# Patient Record
Sex: MALE | Race: WHITE | NOT HISPANIC OR LATINO | URBAN - METROPOLITAN AREA
[De-identification: names, ages, dates, MRNs, and addresses within clinical notes are randomized per-mention and may not be internally consistent; named-entity substitution may affect disease eponyms.]

---

## 2017-08-11 NOTE — PROCEDURE NOTE: CLINICAL
PROCEDURE NOTE: Avastin () #3 OD. Diagnosis: Neovascular AMD with Inactive CNV. Prior to the original injection, risks/benefits/alternatives discussed including infection, loss of vision, hemorrhage, cataract, glaucoma, retinal tears or detachment. A written consent is on file, and the need for today’s injection was discussed and the patient is understanding and wishes to proceed. The off-label status of Intravitreal Avastin also was reviewed. The patient wished to proceed with treatment. The patient wished to proceed with treatment. Topical anesthetic drops were applied to the eye. Betadine prep was performed. Surgical mask worn. Sterile drape and lid speculum were applied. Using the syringe provided, Avastin 1.25 mg in 0.05 cc was injected into the vitreous cavity. Injection site: 3-4 mm from the limbus. Patient tolerated procedure well. Following the intravitreal injection, the sterile lid speculum was removed. CRA perfusion confirmed. CF vision checked. Patient given office phone number/answering service number and advised to call immediately should there be an increase in floaters or redness, loss of vision or pain, or should they have any other questions or concerns. Tomasa Blankenship

## 2017-08-11 NOTE — PATIENT DISCUSSION
Cataract is significant, VA more than likely to improve with cataract sx. Recc Cat Eval with Dr. Joyce Pate next Available.

## 2017-08-11 NOTE — PATIENT DISCUSSION
Recommended Avastin injection. The injection was given and tolerated well by patient. Post-injection instructions were reviewed and understood by the patient.

## 2017-08-11 NOTE — PATIENT DISCUSSION
Will continue current management. Possible injection at next follow up depending on OCT. Will continue with Avastin due to insurance.

## 2017-09-01 NOTE — PATIENT DISCUSSION
On POD #1, the patient is doing well. Post-op cataract sheet was given and reviewed. No swimming for 2 weeks. Contact immediately for decreased vision, red eye or pain. Advised against heavy lifting, prolonged bending, or straining. No make up/showers/water to the eye. Emergency phone numbers given for patient to call at any time.

## 2017-09-18 NOTE — PATIENT DISCUSSION
Recommended injection. The injection was given and tolerated well by patient. Post-injection instructions were reviewed and understood by the patient.

## 2017-09-18 NOTE — PROCEDURE NOTE: CLINICAL
PROCEDURE NOTE: Avastin () #4 OS. Diagnosis: Neovascular AMD with Inactive CNV. Anesthesia: Topical. Risks and benefits of the procedure were explained in detail to the patient including loss of vision, loss of eye, infection, bleeding, retinal detachment, glaucoma, need for additional procedures, and cataract. Additionally. The patient was informed that AVASTIN is not approved for intraocular use and is being administered off label. Alternatives including other AntiNegativeFG agents were discussed with the patient. The patient was given the opportunity to ask any questions, and address any concerns. Patient understood the risks and benefits, and signed written consent for surgery. Tetravisc Given *. Topical anesthesia was induced with Alcaine. 4% lidocaine pledge. Betadine prep was performed. Product is within expiration date, and has been verified. An unopened 30 g needle was securely affixed to the 1 cc syringe. Excess drug and air bubbles were carefully expressed such that the plunger aligned with the .05 mL rut on the syringe. A lid speculum was used. After the Betadine prep, intravitreal injection of Avastin 1.25mg/0.05 ml was given. Injection site: 3-4 mm from the limbus. The needle was withdrawn; retinal perfusion was verified. Lid speculum removed. The eye was irrigated with sterile irrigating solution. The betadine was washed away. Patient tolerated procedure well. Count fingers vision was verified. There were no complications. Patient given office phone number/answering service phone number. Post-injection instructions were reviewed and understood. Symptoms of RD and endophthalmitis following intravitreal injection were discussed. Patient advised to call right away if any loss of vision, new floaters, or eye pain. Post-op instructions given. Patient was given the standard instruction sheet. Appropriate follow-up was arranged. Brayden Barbosa

## 2017-09-29 NOTE — PATIENT DISCUSSION
On POD #1, the patient is doing well. Post-op cataract sheet was given and reviewed. Instructed to continue antibiotic drops for 7 days, steroid drops 4 times a day for 2 weeks, then 2 times a day for 2-4 weeks and NSAID drops for 4-6 weeks. Eye shield for 5 nights. No swimming for 2 weeks.

## 2017-10-23 NOTE — PROCEDURE NOTE: CLINICAL
PROCEDURE NOTE: Avastin () #5 OS. Diagnosis: Neovascular AMD with Active CNV. Prep: Betadine Drops and Betadine Scrub. Prior to the original injection, risks/benefits/alternatives discussed including infection, loss of vision, hemorrhage, cataract, glaucoma, retinal tears or detachment. A written consent is on file, and the need for today’s injection was discussed and the patient is understanding and wishes to proceed. The off-label status of Intravitreal Avastin also was reviewed. The patient wished to proceed with treatment. The patient wished to proceed with treatment. Topical anesthetic drops were applied to the eye. Betadine prep was performed. Surgical mask worn. Sterile drape and lid speculum were applied. Using the syringe provided, Avastin 1.25 mg in 0.05 cc was injected into the vitreous cavity. Injection site: 3-4 mm from the limbus. Patient tolerated procedure well. Following the intravitreal injection, the sterile lid speculum was removed. CRA perfusion confirmed. CF vision checked. Patient given office phone number/answering service number and advised to call immediately should there be an increase in floaters or redness, loss of vision or pain, or should they have any other questions or concerns. Brayden Barbosa

## 2017-12-04 NOTE — PATIENT DISCUSSION
Pt edu stable, due to high tendency to return rec Avastin # 6 today. Pt elects to proceed. Donna Zaragoza injected Lot # [id number] Exp Date: 01/01/2018.

## 2017-12-04 NOTE — PROCEDURE NOTE: CLINICAL
PROCEDURE NOTE: Avastin () #6 OS. Diagnosis: Neovascular AMD with Active CNV. Prep: Betadine Drops and Betadine Scrub. Prior to the original injection, risks/benefits/alternatives discussed including infection, loss of vision, hemorrhage, cataract, glaucoma, retinal tears or detachment. A written consent is on file, and the need for today’s injection was discussed and the patient is understanding and wishes to proceed. The off-label status of Intravitreal Avastin also was reviewed. The patient wished to proceed with treatment. The patient wished to proceed with treatment. Topical anesthetic drops were applied to the eye. Betadine prep was performed. Surgical mask worn. Sterile drape and lid speculum were applied. Using the syringe provided, Avastin 1.25 mg in 0.05 cc was injected into the vitreous cavity. Injection site: 3-4 mm from the limbus. Patient tolerated procedure well. Following the intravitreal injection, the sterile lid speculum was removed. CRA perfusion confirmed. CF vision checked. Patient given office phone number/answering service number and advised to call immediately should there be an increase in floaters or redness, loss of vision or pain, or should they have any other questions or concerns. [facility]

## 2017-12-11 NOTE — PROCEDURE NOTE: CLINICAL
PROCEDURE NOTE: Avastin () #4 OD. Diagnosis: Neovascular AMD with Active CNV. Prior to the original injection, risks/benefits/alternatives discussed including infection, loss of vision, hemorrhage, cataract, glaucoma, retinal tears or detachment. A written consent is on file, and the need for today’s injection was discussed and the patient is understanding and wishes to proceed. The off-label status of Intravitreal Avastin also was reviewed. The patient wished to proceed with treatment. The patient wished to proceed with treatment. Topical anesthetic drops were applied to the eye. Betadine prep was performed. Surgical mask worn. Sterile drape and lid speculum were applied. Using the syringe provided, Avastin 1.25 mg in 0.05 cc was injected into the vitreous cavity. Injection site: 3-4 mm from the limbus. Patient tolerated procedure well. Following the intravitreal injection, the sterile lid speculum was removed. CRA perfusion confirmed. CF vision checked. Patient given office phone number/answering service number and advised to call immediately should there be an increase in floaters or redness, loss of vision or pain, or should they have any other questions or concerns. Zoya Cruz

## 2017-12-11 NOTE — PATIENT DISCUSSION
Avastin #4 injection recommended today after discussion of benefits, risks, alternatives, and off-label use. The injection was administered without complication. Post-injection instructions were reviewed and understood by the patient.

## 2017-12-11 NOTE — PATIENT DISCUSSION
Pt edu stable, due to high tendency to return rec Avastin # 6 today. Pt elects to proceed. Donna Zaragoza injected Lot # [id number] Exp Date: 01/01/2018.

## 2017-12-19 NOTE — PATIENT DISCUSSION
Pt edu stable, due to high tendency to return rec Avastin # 6 today. Pt elects to proceed. Donna Zaragoza injected Lot # [id number] Exp Date: 01/01/2018.

## 2017-12-19 NOTE — PROCEDURE NOTE: CLINICAL
PROCEDURE NOTE: Epilation #3 Right Upper Lid. Diagnosis: Trichiasis. Anesthesia: Topical. Prior to treatment, the risks/benefits/alternatives were discussed. The patient wished to proceed with procedure. Aberrant lashes removed from * lid(s) using microforcep. Patient tolerated procedure well. There were no complications. Post-op instructions given. Stuart Tejeda

## 2022-03-15 ENCOUNTER — ESTABLISHED PATIENT (OUTPATIENT)
Dept: URBAN - METROPOLITAN AREA CLINIC 20 | Facility: CLINIC | Age: 72
End: 2022-03-15

## 2022-03-15 DIAGNOSIS — H35.363: ICD-10-CM

## 2022-03-15 DIAGNOSIS — H16.223: ICD-10-CM

## 2022-03-15 DIAGNOSIS — H25.813: ICD-10-CM

## 2022-03-15 PROCEDURE — 68761 CLOSE TEAR DUCT OPENING: CPT

## 2022-03-15 PROCEDURE — 99213 OFFICE O/P EST LOW 20 MIN: CPT

## 2022-03-15 ASSESSMENT — VISUAL ACUITY
OU_SC: J1+
OD_CC: 20/25
OS_CC: 20/20

## 2022-03-15 ASSESSMENT — TONOMETRY
OS_IOP_MMHG: 12
OD_IOP_MMHG: 12

## 2022-03-15 NOTE — PROCEDURE NOTE: CLINICAL
PROCEDURE NOTE: Punctal Plugs, Extended Bilateral Lower Lids. 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 Prior to treatment, the risks/benefits/alternatives were discussed. The patient wished to proceed with procedure. Extended duration plugs were inserted. Brand: Greenpie. Size: 0.3mm Location: RLL/LLL punctum. Patient tolerated procedure well. There were no complications. Post procedure instructions given. Renée Sandy

## 2022-04-15 ENCOUNTER — ESTABLISHED PATIENT (OUTPATIENT)
Dept: URBAN - METROPOLITAN AREA CLINIC 20 | Facility: CLINIC | Age: 72
End: 2022-04-15

## 2022-04-15 DIAGNOSIS — H25.813: ICD-10-CM

## 2022-04-15 DIAGNOSIS — H35.363: ICD-10-CM

## 2022-04-15 DIAGNOSIS — H35.411: ICD-10-CM

## 2022-04-15 PROCEDURE — 92014 COMPRE OPH EXAM EST PT 1/>: CPT

## 2022-04-15 PROCEDURE — 92250 FUNDUS PHOTOGRAPHY W/I&R: CPT

## 2022-04-15 ASSESSMENT — KERATOMETRY
OS_AXISANGLE_DEGREES: 134
OS_AXISANGLE2_DEGREES: 44
OD_AXISANGLE_DEGREES: 171
OS_K2POWER_DIOPTERS: 43.25
OS_K1POWER_DIOPTERS: 43.00
OD_AXISANGLE2_DEGREES: 81
OD_K2POWER_DIOPTERS: 43.50
OD_K1POWER_DIOPTERS: 43.00

## 2022-04-15 ASSESSMENT — TONOMETRY
OS_IOP_MMHG: 9
OD_IOP_MMHG: 12

## 2022-04-15 ASSESSMENT — VISUAL ACUITY
OS_CC: 20/30
OD_CC: 20/20

## 2022-04-15 NOTE — PATIENT DISCUSSION
Patient has enough contacts to get him at least to Oct. when he is due for his CEE with Dr Douglas Conroy He will call to schedule before he runs out.

## 2022-07-15 ENCOUNTER — ESTABLISHED PATIENT (OUTPATIENT)
Dept: URBAN - METROPOLITAN AREA CLINIC 20 | Facility: CLINIC | Age: 72
End: 2022-07-15

## 2022-07-15 DIAGNOSIS — H16.223: ICD-10-CM

## 2022-07-15 DIAGNOSIS — H35.363: ICD-10-CM

## 2022-07-15 DIAGNOSIS — H25.813: ICD-10-CM

## 2022-07-15 DIAGNOSIS — H35.411: ICD-10-CM

## 2022-07-15 PROCEDURE — 99213 OFFICE O/P EST LOW 20 MIN: CPT

## 2022-07-15 PROCEDURE — 68761 CLOSE TEAR DUCT OPENING: CPT

## 2022-07-15 ASSESSMENT — VISUAL ACUITY
OS_CC: 20/25
OD_CC: 20/25

## 2022-07-15 ASSESSMENT — KERATOMETRY
OS_AXISANGLE_DEGREES: 134
OS_AXISANGLE2_DEGREES: 44
OD_K2POWER_DIOPTERS: 43.50
OD_AXISANGLE_DEGREES: 171
OD_AXISANGLE2_DEGREES: 81
OS_K2POWER_DIOPTERS: 43.25
OS_K1POWER_DIOPTERS: 43.00
OD_K1POWER_DIOPTERS: 43.00

## 2022-07-15 NOTE — PROCEDURE NOTE: CLINICAL
PROCEDURE NOTE: Punctal Plugs, Extended Bilateral Lower Lids. Diagnosis: Acute Allergic Conjunctivitis. Prior to treatment, the risks/benefits/alternatives were discussed. The patient wished to proceed with procedure. Extended duration plugs were inserted. Brand: Sara Drew. Size: 0.3mm Location: RLL/LLL punctum. Patient tolerated procedure well. There were no complications. Post procedure instructions given. Johny Salas

## 2022-07-15 NOTE — PATIENT DISCUSSION
Patient has enough contacts to get him at least to Oct. when he is due for his CEE with Dr Aren Salas He will call to schedule before he runs out.

## 2022-09-11 NOTE — PATIENT DISCUSSION
Will continue current management. Possible injection at next follow up depending on OCT. Will continue with Avastin due to insurance. No

## 2022-11-15 ENCOUNTER — ESTABLISHED PATIENT (OUTPATIENT)
Dept: URBAN - METROPOLITAN AREA CLINIC 20 | Facility: CLINIC | Age: 72
End: 2022-11-15

## 2022-11-15 DIAGNOSIS — H35.411: ICD-10-CM

## 2022-11-15 DIAGNOSIS — H10.13: ICD-10-CM

## 2022-11-15 DIAGNOSIS — H35.363: ICD-10-CM

## 2022-11-15 DIAGNOSIS — H25.813: ICD-10-CM

## 2022-11-15 DIAGNOSIS — H16.223: ICD-10-CM

## 2022-11-15 PROCEDURE — 92014 COMPRE OPH EXAM EST PT 1/>: CPT

## 2022-11-15 PROCEDURE — 68761 CLOSE TEAR DUCT OPENING: CPT

## 2022-11-15 ASSESSMENT — TONOMETRY
OD_IOP_MMHG: 8
OS_IOP_MMHG: 10

## 2022-11-15 ASSESSMENT — KERATOMETRY
OD_AXISANGLE_DEGREES: 002
OD_K2POWER_DIOPTERS: 44.00
OD_AXISANGLE2_DEGREES: 92
OS_K2POWER_DIOPTERS: 44.00
OS_AXISANGLE_DEGREES: 170
OS_AXISANGLE2_DEGREES: 80
OD_K1POWER_DIOPTERS: 43.25
OS_K1POWER_DIOPTERS: 43.00

## 2022-11-15 ASSESSMENT — VISUAL ACUITY
OS_CC: 20/30+2
OD_CC: 20/30+2

## 2022-11-15 NOTE — PATIENT DISCUSSION
Patient has enough contacts to get him at least to Oct. when he is due for his CEE with Dr Willa Jernigan He will call to schedule before he runs out.

## 2022-11-15 NOTE — PROCEDURE NOTE: CLINICAL
PROCEDURE NOTE: Punctal Plugs, Extended Bilateral Lower Lids. 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 Prior to treatment, the risks/benefits/alternatives were discussed. The patient wished to proceed with procedure. Extended duration plugs were inserted. Brand: Duraplug. Size: 0.3mm x 2.0mm Location: BLL punctum. Patient tolerated procedure well. There were no complications. Post procedure instructions given. Tatiana Gatica

## 2022-12-28 ENCOUNTER — ESTABLISHED PATIENT (OUTPATIENT)
Dept: URBAN - METROPOLITAN AREA CLINIC 20 | Facility: CLINIC | Age: 72
End: 2022-12-28

## 2022-12-28 PROCEDURE — 92310E CONTACT LENS 100

## 2022-12-28 PROCEDURE — 92015 DETERMINE REFRACTIVE STATE: CPT

## 2022-12-28 PROCEDURE — 92014 COMPRE OPH EXAM EST PT 1/>: CPT

## 2022-12-28 PROCEDURE — 92134 CPTRZ OPH DX IMG PST SGM RTA: CPT

## 2022-12-28 ASSESSMENT — VISUAL ACUITY
OS_CC: 20/40-2
OD_CC: 20/25-2
OU_SC: J1+
OU_CC: 20/20-3

## 2022-12-28 ASSESSMENT — KERATOMETRY
OS_K2POWER_DIOPTERS: 43.75
OD_AXISANGLE_DEGREES: 0
OD_K1POWER_DIOPTERS: 43.00
OD_AXISANGLE2_DEGREES: 90
OD_K2POWER_DIOPTERS: 43.00
OS_AXISANGLE_DEGREES: 161
OS_AXISANGLE2_DEGREES: 71
OS_K1POWER_DIOPTERS: 43.00

## 2022-12-28 ASSESSMENT — TONOMETRY
OD_IOP_MMHG: 9
OS_IOP_MMHG: 9

## 2023-07-20 ENCOUNTER — FOLLOW UP (OUTPATIENT)
Dept: URBAN - METROPOLITAN AREA CLINIC 20 | Facility: CLINIC | Age: 73
End: 2023-07-20

## 2023-07-20 DIAGNOSIS — H16.223: ICD-10-CM

## 2023-07-20 PROCEDURE — 68761 CLOSE TEAR DUCT OPENING: CPT

## 2023-07-20 PROCEDURE — 99213 OFFICE O/P EST LOW 20 MIN: CPT

## 2023-07-20 ASSESSMENT — TONOMETRY
OD_IOP_MMHG: 10
OS_IOP_MMHG: 11

## 2023-07-20 ASSESSMENT — VISUAL ACUITY
OD_CC: 20/25-2
OS_CC: 20/40+1

## 2023-11-06 NOTE — PATIENT DISCUSSION
Discussed stopping treatment after next visit. monitor today. Detail Level: Detailed General Sunscreen Counseling: I recommended a broad spectrum sunscreen with a SPF of 30 or higher.  I explained that SPF 30 sunscreens block approximately 97 percent of the sun's harmful rays.  Sunscreens should be applied at least 15 minutes prior to expected sun exposure and then every 2 hours after that as long as sun exposure continues. If swimming or exercising sunscreen should be reapplied every 45 minutes to an hour after getting wet or sweating.  One ounce, or the equivalent of a shot glass full of sunscreen, is adequate to protect the skin not covered by a bathing suit. I also recommended a lip balm with a sunscreen as well. Sun protective clothing can be used in lieu of sunscreen but must be worn the entire time you are exposed to the sun's rays.

## 2023-11-27 ENCOUNTER — FOLLOW UP (OUTPATIENT)
Dept: URBAN - METROPOLITAN AREA CLINIC 20 | Facility: CLINIC | Age: 73
End: 2023-11-27

## 2023-11-27 DIAGNOSIS — H25.813: ICD-10-CM

## 2023-11-27 DIAGNOSIS — H16.223: ICD-10-CM

## 2023-11-27 DIAGNOSIS — H52.4: ICD-10-CM

## 2023-11-27 PROCEDURE — 92014 COMPRE OPH EXAM EST PT 1/>: CPT

## 2023-11-27 PROCEDURE — 68761 CLOSE TEAR DUCT OPENING: CPT

## 2023-11-27 ASSESSMENT — KERATOMETRY
OD_AXISANGLE_DEGREES: 2
OS_K2POWER_DIOPTERS: 43.50
OS_AXISANGLE2_DEGREES: 79
OS_AXISANGLE_DEGREES: 169
OD_AXISANGLE2_DEGREES: 92
OD_K2POWER_DIOPTERS: 43.50
OS_K1POWER_DIOPTERS: 43.00
OD_K1POWER_DIOPTERS: 43.00

## 2023-11-27 ASSESSMENT — VISUAL ACUITY
OU_CC: 20/25+1
OD_CC: 20/25
OS_CC: 20/30+1

## 2023-11-27 ASSESSMENT — TONOMETRY
OS_IOP_MMHG: 9
OD_IOP_MMHG: 11

## 2024-03-28 ENCOUNTER — ESTABLISHED PATIENT (OUTPATIENT)
Dept: URBAN - METROPOLITAN AREA CLINIC 20 | Facility: CLINIC | Age: 74
End: 2024-03-28

## 2024-03-28 DIAGNOSIS — H16.223: ICD-10-CM

## 2024-03-28 PROCEDURE — 99213 OFFICE O/P EST LOW 20 MIN: CPT

## 2024-03-28 PROCEDURE — A4262 TEMPORARY TEAR DUCT PLUG: HCPCS

## 2024-03-28 ASSESSMENT — VISUAL ACUITY
OU_CC: 20/20
OD_CC: 20/25
OS_CC: 20/20

## 2024-03-28 ASSESSMENT — TONOMETRY
OD_IOP_MMHG: 11
OS_IOP_MMHG: 9

## 2024-07-26 ENCOUNTER — FOLLOW UP (OUTPATIENT)
Dept: URBAN - METROPOLITAN AREA CLINIC 20 | Facility: CLINIC | Age: 74
End: 2024-07-26

## 2024-07-26 DIAGNOSIS — H16.223: ICD-10-CM

## 2024-07-26 PROCEDURE — A4262 TEMPORARY TEAR DUCT PLUG: HCPCS

## 2024-07-26 PROCEDURE — 99214 OFFICE O/P EST MOD 30 MIN: CPT

## 2024-07-26 ASSESSMENT — TONOMETRY
OD_IOP_MMHG: 11
OS_IOP_MMHG: 10

## 2024-07-26 ASSESSMENT — VISUAL ACUITY
OU_SC: 20/20
OS_CC: 20/30-1
OD_CC: 20/30

## 2024-12-06 ENCOUNTER — COMPREHENSIVE EXAM (OUTPATIENT)
Age: 74
End: 2024-12-06

## 2024-12-06 DIAGNOSIS — H25.813: ICD-10-CM

## 2024-12-06 DIAGNOSIS — H16.223: ICD-10-CM

## 2024-12-06 PROCEDURE — 68761 CLOSE TEAR DUCT OPENING: CPT | Mod: 50,E2,E4

## 2024-12-06 PROCEDURE — 99213 OFFICE O/P EST LOW 20 MIN: CPT | Mod: 25

## 2024-12-13 ENCOUNTER — FOLLOW UP (OUTPATIENT)
Age: 74
End: 2024-12-13

## 2024-12-13 DIAGNOSIS — H52.13: ICD-10-CM

## 2024-12-13 DIAGNOSIS — H52.4: ICD-10-CM

## 2024-12-13 DIAGNOSIS — H52.202: ICD-10-CM

## 2024-12-13 PROCEDURE — 92310 CONTACT LENS FITTING OU: CPT

## 2024-12-13 PROCEDURE — 92015 DETERMINE REFRACTIVE STATE: CPT

## 2025-04-03 ENCOUNTER — FOLLOW UP (OUTPATIENT)
Age: 75
End: 2025-04-03

## 2025-04-03 DIAGNOSIS — H16.223: ICD-10-CM

## 2025-04-03 DIAGNOSIS — H25.813: ICD-10-CM

## 2025-04-03 PROCEDURE — 68761 CLOSE TEAR DUCT OPENING: CPT | Mod: 50,E2,E4

## 2025-04-03 PROCEDURE — 99213 OFFICE O/P EST LOW 20 MIN: CPT | Mod: 25

## 2025-08-07 ENCOUNTER — FOLLOW UP (OUTPATIENT)
Age: 75
End: 2025-08-07

## 2025-08-07 DIAGNOSIS — H16.223: ICD-10-CM

## 2025-08-07 PROCEDURE — 68761 CLOSE TEAR DUCT OPENING: CPT | Mod: 50

## 2025-08-07 PROCEDURE — 99214 OFFICE O/P EST MOD 30 MIN: CPT
